# Patient Record
Sex: MALE | Race: WHITE | URBAN - METROPOLITAN AREA
[De-identification: names, ages, dates, MRNs, and addresses within clinical notes are randomized per-mention and may not be internally consistent; named-entity substitution may affect disease eponyms.]

---

## 2021-12-20 ENCOUNTER — OFFICE VISIT (OUTPATIENT)
Dept: ORTHOPEDIC SURGERY | Age: 46
End: 2021-12-20
Payer: COMMERCIAL

## 2021-12-20 VITALS — WEIGHT: 220 LBS | HEIGHT: 69 IN | BODY MASS INDEX: 32.58 KG/M2

## 2021-12-20 DIAGNOSIS — M25.562 ACUTE PAIN OF LEFT KNEE: ICD-10-CM

## 2021-12-20 DIAGNOSIS — S83.242A TEAR OF MEDIAL MENISCUS OF LEFT KNEE, CURRENT, UNSPECIFIED TEAR TYPE, INITIAL ENCOUNTER: Primary | ICD-10-CM

## 2021-12-20 PROCEDURE — 99203 OFFICE O/P NEW LOW 30 MIN: CPT | Performed by: PHYSICIAN ASSISTANT

## 2021-12-20 RX ORDER — METHYLPREDNISOLONE 4 MG/1
TABLET ORAL
Qty: 1 KIT | Refills: 0 | Status: SHIPPED | OUTPATIENT
Start: 2021-12-20

## 2021-12-20 NOTE — PROGRESS NOTES
Patient Name:Celestino Daugherty Record Number: <[id number]>  YOB: 1975  Date of Encounter: 12/20/2021     Chief Complaint   Patient presents with    New Patient     Left knee pain and instability for 3 weeks, pain is medial Had PCL tear 12 years ago that was not repaired. History of Present Illness:  Buddy Estrada is a 42 y.o. male here for evaluation of his left knee. His pain assessment is documented below and I reviewed this with him today. Patient states he ruptured his PCL 13 years ago and opted not to have it fixed. He denies having any significant left knee pain over the last several years. He states 3 weeks ago he began having left knee pain that goes up to a 7/10. He denies any recent injury. He states most of his pain is along the medial aspect of his left knee. He denies swelling, redness or warmth of the left knee. He does work in a [employer] setting and does a lot of walking and standing    Pain Assessment  Location of Pain: Knee  Location Modifiers: Left  Severity of Pain: 7  Quality of Pain: Sharp,Other (Comment) (burning)  Duration of Pain: Persistent  Frequency of Pain: Constant  Date Pain First Started:  (3 weeks ago)  Aggravating Factors: Stairs,Walking,Other (Comment),Kneeling,Squatting,Bending,Straightening (Pivoting)  Limiting Behavior: Some  Relieving Factors: Rest  Result of Injury: No  Work-Related Injury: No  Are there other pain locations you wish to document?: No    History reviewed. No pertinent past medical history. History reviewed. No pertinent surgical history. Current Outpatient Medications   Medication Sig Dispense Refill    methylPREDNISolone (MEDROL, PHILLIP,) 4 MG tablet By mouth. 1 kit 0     No current facility-administered medications for this visit. Allergies, social and family histories were reviewed and updated as appropriate.     Review of Systems:  Relevant review of systems reviewed and available in the patient's chart under the 'MEDIA' tab.    Vital Signs:  Ht 5' 9\" (1.753 m)   Wt 220 lb (99.8 kg)   BMI 32.49 kg/m²     General Exam:   Constitutional: Patient is adequately groomed with no evidence of malnutrition  Mental Status: The patient is oriented to time, place and person. The patient's mood and affect are appropriate. Lymphatic: The lymphatic examination bilaterally reveals all areas to be without enlargement or induration. Neurological: The patient has good coordination and balance. There is no focal weakness or sensory deficit. Left Knee Examination:    Inspection: Normal muscle contours and no significant limb length discrepancy. No gross atrophy in any particular myotome. There is no obvious swelling or joint effusion of the knee. There are no abrasions, lacerations, contusions, hematomas or ecchymosis. There is no erythema, induration or warmth to suggest an infectious process. Palpation: Patient has moderate tenderness on palpation over the medial joint line. There is minimal patellofemoral crepitus. Range of Motion:    Flexion: 120°   Extension: 0°    Strength:  Quad 5 / 5  ; Hamstrings 5 / 5. Gross motor to hip and ankle intact    Special Tests:    Lachman (ACL) - negative   Posterior Lachman (PCL) - negative    Anterior Drawer (ACL) - negative   Posterior Drawer (PCL) - negative   Pivot shift (ACL) - negative    Posterior sag (PCL) - negative   Emily's Test (Meniscus) - Positive   Homans Test (Thrombophlebitis)- negative   Does not open to valgus or varus stress at 0 or 30° (MCL/LCL)    Skin: There are no rashes, ulcerations or lesions. Sensation to light touch intact. Circulation: The limb is warm and well perfused. Distal pulses intact. Capillary refill is intact. Edema: none. Venous stasis changes: none. Gait: Patient has a antalgic gait and is taking short strides.      Radiology:  X-rays obtained today and reviewed in office:  Views: 4 view left knee with comparison AP, flexion PA and skyline views of the right knee  Impression: No evidence for acute fracture or subluxation / dislocation. There are no loose bodies appreciated. There is no evidence of tibiofemoral subluxation. There is minimal loss of joint space. Orders:  Orders Placed This Encounter   Procedures    XR KNEE BILATERAL STANDING     Standing Status:   Future     Number of Occurrences:   1     Standing Expiration Date:   1/20/2022    XR KNEE LEFT (3 VIEWS)     Standing Status:   Future     Number of Occurrences:   1     Standing Expiration Date:   1/20/2022       Impression:   Diagnosis Orders   1. Tear of medial meniscus of left knee, current, unspecified tear type, initial encounter  methylPREDNISolone (MEDROL, PHILLIP,) 4 MG tablet   2. Acute pain of left knee  XR KNEE BILATERAL STANDING    XR KNEE LEFT (3 VIEWS)       Treatment Plan:          Patient clinically has a medial meniscus tear. This is likely chronic. He has been trying ibuprofen at home with only mild relief of his pain. We discussed other conservative treatment options. We discussed proceeding with cortisone however he would like to hold off on injections at this time. He is given a Medrol Dosepak. He is also provided a handout of home exercises and stretches. He will rest, ice and elevate the left knee as needed. He will avoid high impact or strenuous activities. He is advised to call the office in 10 to 14 days to let me know how he is doing. If pain does not improve our next step will be formal physical therapy. Patient might eventually require advanced imaging. Buddy Estrada was informed of the results of any imaging. We discussed treatment options and a time was given to answer questions. A plan was proposed and Buddy Estrada understand and accepts this course of care.           Electronically signed by Logan Crouch PA-C on 26/65/3302  Board Certified [facility]    Please note that portions of this note were completed with a voice recognition program.  Efforts were made to edit the dictations but occasionally words are mis-transcribed.

## 2023-07-19 ENCOUNTER — HOSPITAL ENCOUNTER (EMERGENCY)
Age: 48
Discharge: HOME | End: 2023-07-19
Attending: EMERGENCY MEDICINE
Payer: COMMERCIAL

## 2023-07-19 ENCOUNTER — APPOINTMENT (OUTPATIENT)
Dept: CT IMAGING | Age: 48
End: 2023-07-19
Payer: COMMERCIAL

## 2023-07-19 VITALS
DIASTOLIC BLOOD PRESSURE: 89 MMHG | OXYGEN SATURATION: 97 % | RESPIRATION RATE: 16 BRPM | SYSTOLIC BLOOD PRESSURE: 146 MMHG | TEMPERATURE: 98 F | HEART RATE: 96 BPM

## 2023-07-19 DIAGNOSIS — R22.9 SKIN NODULE: Primary | ICD-10-CM

## 2023-07-19 DIAGNOSIS — R73.9 HYPERGLYCEMIA: ICD-10-CM

## 2023-07-19 LAB
ANION GAP SERPL CALCULATED.3IONS-SCNC: 9 MMOL/L (ref 3–16)
BASOPHILS # BLD: 0.1 K/UL (ref 0–0.2)
BASOPHILS NFR BLD: 0.5 %
BUN SERPL-MCNC: 14 MG/DL (ref 7–20)
CALCIUM SERPL-MCNC: 9.1 MG/DL (ref 8.3–10.6)
CHLORIDE SERPL-SCNC: 102 MMOL/L (ref 99–110)
CO2 SERPL-SCNC: 23 MMOL/L (ref 21–32)
CREAT SERPL-MCNC: <0.5 MG/DL (ref 0.9–1.3)
DEPRECATED RDW RBC AUTO: 13.6 % (ref 12.4–15.4)
EOSINOPHIL # BLD: 0.3 K/UL (ref 0–0.6)
EOSINOPHIL NFR BLD: 2.8 %
GFR SERPLBLD CREATININE-BSD FMLA CKD-EPI: >60 ML/MIN/{1.73_M2}
GLUCOSE SERPL-MCNC: 300 MG/DL (ref 70–99)
HCT VFR BLD AUTO: 46.5 % (ref 40.5–52.5)
HGB BLD-MCNC: 16 G/DL (ref 13.5–17.5)
LYMPHOCYTES # BLD: 3.1 K/UL (ref 1–5.1)
LYMPHOCYTES NFR BLD: 28.6 %
MCH RBC QN AUTO: 31.9 PG (ref 26–34)
MCHC RBC AUTO-ENTMCNC: 34.3 G/DL (ref 31–36)
MCV RBC AUTO: 93 FL (ref 80–100)
MONOCYTES # BLD: 0.7 K/UL (ref 0–1.3)
MONOCYTES NFR BLD: 6.6 %
NEUTROPHILS # BLD: 6.7 K/UL (ref 1.7–7.7)
NEUTROPHILS NFR BLD: 61.5 %
PLATELET # BLD AUTO: 216 K/UL (ref 135–450)
PMV BLD AUTO: 9.2 FL (ref 5–10.5)
POTASSIUM SERPL-SCNC: 4.3 MMOL/L (ref 3.5–5.1)
RBC # BLD AUTO: 5 M/UL (ref 4.2–5.9)
SODIUM SERPL-SCNC: 134 MMOL/L (ref 136–145)
WBC # BLD AUTO: 10.9 K/UL (ref 4–11)

## 2023-07-19 PROCEDURE — 80048 BASIC METABOLIC PNL TOTAL CA: CPT

## 2023-07-19 PROCEDURE — 99285 EMERGENCY DEPT VISIT HI MDM: CPT

## 2023-07-19 PROCEDURE — 85025 COMPLETE CBC W/AUTO DIFF WBC: CPT

## 2023-07-19 PROCEDURE — 74177 CT ABD & PELVIS W/CONTRAST: CPT

## 2023-07-19 PROCEDURE — 6360000004 HC RX CONTRAST MEDICATION: Performed by: PHYSICIAN ASSISTANT

## 2023-07-19 RX ORDER — NAPROXEN 500 MG/1
500 TABLET ORAL 2 TIMES DAILY
Qty: 20 TABLET | Refills: 0 | Status: SHIPPED | OUTPATIENT
Start: 2023-07-19 | End: 2023-07-29

## 2023-07-19 RX ADMIN — IOPAMIDOL 75 ML: 755 INJECTION, SOLUTION INTRAVENOUS at 12:52

## 2023-07-19 ASSESSMENT — ENCOUNTER SYMPTOMS
SHORTNESS OF BREATH: 0
DIARRHEA: 0
RECTAL PAIN: 0
ABDOMINAL PAIN: 0
NAUSEA: 0
COLOR CHANGE: 0
VOMITING: 0

## 2023-07-19 ASSESSMENT — LIFESTYLE VARIABLES
HOW MANY STANDARD DRINKS CONTAINING ALCOHOL DO YOU HAVE ON A TYPICAL DAY: PATIENT DOES NOT DRINK
HOW OFTEN DO YOU HAVE A DRINK CONTAINING ALCOHOL: NEVER

## 2023-07-19 ASSESSMENT — PAIN - FUNCTIONAL ASSESSMENT: PAIN_FUNCTIONAL_ASSESSMENT: NONE - DENIES PAIN

## 2023-07-19 NOTE — ED PROVIDER NOTES
[facility]        Pt Name: Pamella Beckman  MRN: [id number]  [address] [facility] [place] 1975  Date of evaluation: 7/19/2023  Provider: Justine Bullard PA-C  PCP: Tyrone Hernandez MD  Note Started: 12:59 PM EDT 7/19/23       I have seen and evaluated this patient with my supervising physician Felipe Ray DO.      CHIEF COMPLAINT       Chief Complaint   Patient presents with    Cyst     Feels like he has a lump to R side x1 week that is painful       HISTORY OF PRESENT ILLNESS: 1 or more Elements     History from : Patient    Limitations to history : None    Pamella Beckman is a 57 y.o. male who presents to the emergency department complaining of right flank mass for 1 week that is painful. He has not noticed the lump/mass prior to 1 week ago. He does not recall any preceding injury. He is able to ambulate without difficulty. Denies abdominal pain, nausea, vomiting, diarrhea, constipation, unexplained weight loss, rash,  complaints. Nursing Notes were all reviewed and agreed with or any disagreements were addressed in the HPI. REVIEW OF SYSTEMS :      Review of Systems   Constitutional:  Negative for chills, fever and unexpected weight change. Respiratory:  Negative for shortness of breath. Cardiovascular:  Negative for chest pain. Gastrointestinal:  Negative for abdominal pain, diarrhea, nausea, rectal pain and vomiting. Genitourinary: Negative. Musculoskeletal:  Positive for myalgias. Skin:  Negative for color change, pallor, rash and wound. Neurological: Negative. Psychiatric/Behavioral: Negative. All other systems reviewed and are negative. Positives and Pertinent negatives as per HPI. SURGICAL HISTORY   History reviewed. No pertinent surgical history. CURRENTMEDICATIONS       Previous Medications    METHYLPREDNISOLONE (MEDROL, PHILLIP,) 4 MG TABLET    By mouth.        ALLERGIES     Penicillins    FAMILYHISTORY